# Patient Record
Sex: FEMALE | HISPANIC OR LATINO | URBAN - METROPOLITAN AREA
[De-identification: names, ages, dates, MRNs, and addresses within clinical notes are randomized per-mention and may not be internally consistent; named-entity substitution may affect disease eponyms.]

---

## 2020-06-10 ENCOUNTER — TELEPHONE (OUTPATIENT)
Dept: SURGERY | Facility: CLINIC | Age: 25
End: 2020-06-10

## 2020-06-10 NOTE — TELEPHONE ENCOUNTER
Spoke with Janet in the international department. States patient lives in the [place] and was referred by Dr Alberto Salgado in [place]. Due to Covid 19 she has not been able to travel. States that per Dr Salgado he has spoken about the patient to Dr Quiroz. Patient would like an appointment towards the end of July. Informed [facility] we will need to obtain imaging discs and reports, reports will be in Cambodian. I will verify with Dr Quiroz before making appointment and will return call to [facility] with date and time.

## 2020-07-02 DIAGNOSIS — K50.119 CROHN'S DISEASE OF COLON WITH COMPLICATION: Primary | ICD-10-CM

## 2020-07-02 PROCEDURE — 88321 CONSLTJ&REPRT SLD PREP ELSWR: CPT | Performed by: PATHOLOGY

## 2020-07-27 ENCOUNTER — OFFICE VISIT (OUTPATIENT)
Dept: SURGERY | Facility: CLINIC | Age: 25
End: 2020-07-27

## 2020-07-27 DIAGNOSIS — Z93.2 ILEOSTOMY PRESENT: ICD-10-CM

## 2020-07-27 DIAGNOSIS — K52.9 IBD (INFLAMMATORY BOWEL DISEASE): Primary | ICD-10-CM

## 2020-07-27 PROCEDURE — 99443 PR PHYSICIAN TELEPHONE EVALUATION 21-30 MIN: CPT | Mod: 95,,, | Performed by: COLON & RECTAL SURGERY

## 2020-07-27 PROCEDURE — 99443 PR PHYSICIAN TELEPHONE EVALUATION 21-30 MIN: ICD-10-PCS | Mod: 95,,, | Performed by: COLON & RECTAL SURGERY

## 2020-07-27 NOTE — PROGRESS NOTES
Established Patient - Audio Only Telehealth Visit     The patient location is: [place]  The chief complaint leading to consultation is: IBD  Visit type: Virtual visit with audio only (telephone)  Total time spent with patient: 22 minutes       The reason for the audio only service rather than synchronous audio and video virtual visit was related to technical difficulties or patient preference/necessity.     Each patient to whom I provide medical services by telemedicine is:  (1) informed of the relationship between the physician and patient and the respective role of any other health care provider with respect to management of the patient; and (2) notified that they may decline to receive medical services by telemedicine and may withdraw from such care at any time. Patient verbally consented to receive this service via voice-only telephone call.       HPI: 26 yo F from [place] referred for possible surgery for IBD colitis.  She underwent emergency TAC/EI in [place] in 2010 for presumed UC.  She then underwent flex in [place] 8/2011 in preparation for possible J-pouch-  Per report, the rectal stump was 8-10 cm with deep ulcers, pseudopolyps, severe inflammatory changes, skin tags, deep anal fissure - biopsies showed chronic active colitis c/w IBD, no dysplasia. Slides were sent to [facility] who could not make a determination of UC vs CD based on what they received. The surgeon in  felt that grossly her picture was more c/w Crohn's disease and she was not offered surgery.      Repeat flex sig in  - 12/28/18 - biopsies showed fragments of granulation tissue and inflammation c/w chronic active inflammation.      Slides from this procedure were also reviewed by our pathology Dept at [facility]:  -Chronic active colitis with ulceration and reactive/regenerative changes  -Negative for dysplasia or malignancy   -Findings from both the specimens are of chronic active  colitis and may represent inflammatory bowel disease in appropriate clinical settings. The section from resection specimen shows full thickness inflammation which is more commonly seen in Crohn's disease; however there is only one section available and it's difficult to be certain without examining the whole case including small bowel.      I am meeting her today via a telemedicine Audio visit.  [employer] [facility] (Melissa) was on the call as well to assist with English to [place]ian translation, but the patient was able to understand me and speak with me clearly.  She states that [place] gastroenterologist in [place] feels that she has UC and would be a candidate for a restorative proctectomy/IPAA.  She currently is using mesalamine suppositories at night, not on any other medications.  She is managing her ostomy well, eating well, not having any abdominal complaints.  She has not had any recent abdominal imaging and she has not had a sigmoidoscopy since Dec 2018.     I explained to her that prior to making any decisions regarding surgery, we need to do some more diagnostic investigations.  Based on prior biopsies, there seems to be some concern that she may actually have Crohn's disease and not ulcerative colitis.  The essential step is determining whether she has ulcerative proctitis, Crohn's proctitis, or disuse proctitis.  In order to do this she would need a repeat flexible sigmoidoscopy with more recent biopsies.  Additionally, she will need abdominal imaging - either a CTE or MRE to be sure there is no evidence of small bowel disease.  I told her that these could be done in her home country as long as the results as well as radiographic images and pathologic slides could be sent to us for review.  Alternatively, I would be happy to make arrangements for these to be done here in [place], and if surgery is indicated, plan to proceed with that shortly thereafter.    I told her that if we suspect this is truly  ulcerative colitis, I would plan to perform a completion proctectomy with ileal pouch anal anastomosis and a diverting loop ileostomy, which would be taken down 2-3 months postop.  If we suspect that she has Crohn's disease, and there is significant rectal inflammation endoscopically, she would likely be best served by initiating medical management of the Crohn's disease to see if we are able to reduce the rectal inflammation and potentially perform an ileorectal anastomosis.  If we suspect this is Crohn's disease, and we cannot reduce the rectal inflammation, she would need a completion proctectomy, keeping a permanent ileostomy.    She prefers that a diagnostic studies performed at [facility].  We will make arrangements for her to be scheduled for flexible sigmoidoscopy, followed by a CT enterography, and further plans will be made based on the results of these.      This service was not originating from a related E/M service provided within the previous 7 days nor will  to an E/M service or procedure within the next 24 hours or my soonest available appointment.  Prevailing standard of care was able to be met in this audio-only visit.      Tereso Hernández MD, FACS, FASCRS  Senior Staff Surgeon  Department of Colon & Rectal Surgery

## 2020-07-27 NOTE — LETTER
July 29, 2020      Alberto Salgado MD  [address]  [place] [address]           [facility]  [address]  [facility] [address]  Phone: [phone]  Fax: [phone]          Patient: Debi Caruso   MR Number: [id number]   YOB: 1995   Date of Visit: 7/27/2020       Dear Dr. Alberto Salgado:    Thank you for referring Debi Caruso to me for evaluation. Attached you will find relevant portions of my assessment and plan of care.    If you have questions, please do not hesitate to call me. I look forward to following Debi Caruso along with you.    Sincerely,    Tereso Hernández MD    Enclosure  CC:  No Recipients    If you would like to receive this communication electronically, please contact [email or web address] or [phone] to request more information on [employer] Link access.    For providers and/or their staff who would like to refer a patient to [facility], please contact us through our one-stop-shop provider referral line, [facility], at [phone].    If you feel you have received this communication in error or would no longer like to receive these types of communications, please e-mail [email or web address]